# Patient Record
Sex: MALE | Race: WHITE | NOT HISPANIC OR LATINO | URBAN - METROPOLITAN AREA
[De-identification: names, ages, dates, MRNs, and addresses within clinical notes are randomized per-mention and may not be internally consistent; named-entity substitution may affect disease eponyms.]

---

## 2019-06-15 ENCOUNTER — EMERGENCY (EMERGENCY)
Facility: HOSPITAL | Age: 69
LOS: 1 days | Discharge: HOME | End: 2019-06-15
Attending: EMERGENCY MEDICINE | Admitting: EMERGENCY MEDICINE
Payer: COMMERCIAL

## 2019-06-15 VITALS
DIASTOLIC BLOOD PRESSURE: 79 MMHG | HEART RATE: 73 BPM | RESPIRATION RATE: 18 BRPM | TEMPERATURE: 98 F | SYSTOLIC BLOOD PRESSURE: 124 MMHG | WEIGHT: 134.92 LBS | OXYGEN SATURATION: 96 %

## 2019-06-15 DIAGNOSIS — M25.562 PAIN IN LEFT KNEE: ICD-10-CM

## 2019-06-15 DIAGNOSIS — M23.92 UNSPECIFIED INTERNAL DERANGEMENT OF LEFT KNEE: ICD-10-CM

## 2019-06-15 PROCEDURE — 73564 X-RAY EXAM KNEE 4 OR MORE: CPT | Mod: 26,LT

## 2019-06-15 PROCEDURE — 99283 EMERGENCY DEPT VISIT LOW MDM: CPT | Mod: 25

## 2019-06-15 RX ORDER — INDOMETHACIN 50 MG
1 CAPSULE ORAL
Qty: 21 | Refills: 0
Start: 2019-06-15 | End: 2019-06-21

## 2019-06-15 RX ORDER — INDOMETHACIN 50 MG
25 CAPSULE ORAL ONCE
Refills: 0 | Status: COMPLETED | OUTPATIENT
Start: 2019-06-15 | End: 2019-06-15

## 2019-06-15 RX ADMIN — Medication 25 MILLIGRAM(S): at 15:24

## 2019-06-15 NOTE — ED PROVIDER NOTE - DIAGNOSTIC INTERPRETATION
Interpreted by MD  _left knee x-ray, 4_ views  No fracture, no dislocation (joint spaces grossly normal), no Foreign Body noted, soft tissue normal

## 2019-06-15 NOTE — ED PROVIDER NOTE - OBJECTIVE STATEMENT
68 yo male with no stated pmhx, takes no medications to ED c/o pain left knee after pivot injury last night. Pt states had pain in same knee 5 weeks ago which had been improving. Denies distal paresthesias. endorses mild swelling. No fever/chills.

## 2019-06-15 NOTE — ED PROVIDER NOTE - CLINICAL SUMMARY MEDICAL DECISION MAKING FREE TEXT BOX
findings c/w medial meniscus injury left knee. will place in knee immobilizer, crutches, rx nsaid. Orthopedic follow-up.

## 2019-06-15 NOTE — ED PROVIDER NOTE - CARE PROVIDER_API CALL
Serafin Tamez)  Orthopaedic Surgery  [address] [place] [address]  Phone: [phone]  Fax: [phone]  Follow Up Time:

## 2019-06-15 NOTE — ED ADULT NURSE NOTE - NSIMPLEMENTINTERV_GEN_ALL_ED
Implemented All Universal Safety Interventions:  [place] to call system. Call bell, personal items and telephone within reach. Instruct patient to call for assistance. Room bathroom lighting operational. Non-slip footwear when patient is off stretcher. Physically safe environment: no spills, clutter or unnecessary equipment. Stretcher in lowest position, wheels locked, appropriate side rails in place.